# Patient Record
(demographics unavailable — no encounter records)

---

## 2025-06-09 NOTE — HISTORY OF PRESENT ILLNESS
[de-identified] : 6/9/25:  Visit was done with a  51 y/o F here for initial visit has history of multinodular thyroid for the past 3 years. Her last ultrasound was in 5/4/24 which revealed multinodular thyroid goiter, isthmus nodule increased in size, all other nodules stable, consider FNA for isthmus nodule recommended, and 6 month repeat US recommended. No family history of thyroid cancers, family history positive for other thyroid pathologies. Nonsmoker. Not currently taking thyroid medications. She complains of cough for the past 3 months.

## 2025-06-09 NOTE — PHYSICAL EXAM
[TextEntry] : PHYSICAL EXAM  General: The patient was alert and oriented and in no distress. Voice was clear.  Eyes: The patient was alert, oriented and in no distress. Voice was clear.  Eyes: Ocular motility normal. No proptosis. Conjunctiva normal. Sclera white. There was no significant nystagmus or disconjugate gaze noted.  Face: The patient had no facial asymmetry or mass. The skin was unremarkable.  Ears: External ears were normal without deformity. Ear canals were clear Tympanic membranes were intact and normal. No perforation or effusion  Nose: The external nose had no significant deformity. There was no facial tenderness. On anterior rhinoscopy, the nasal mucosa was healthy in appearance. The anterior septum was midline. There were no visualized polyps purulence or masses. See procedure note for endonasal exam.  Oral cavity: Oral mucosa- normal. Oral and base of tongue- clear and without mass. Gingival and buccal mucosa- moist and without lesions. Palate- the palate moved well. There was no cleft palate. There appeared to be good salivary flow. Oral cavity/oropharynx- no pus, erythema or mass.  Neck: The neck was symmetrical. The parotid and submandibular glands were normal without masses. The trachea was midline and there was no unusual crepitus. Thyroid was smooth and nontender, multinodular goiter Cervical adenopathy- none.

## 2025-06-09 NOTE — PROCEDURE
1 [de-identified] : PROCEDURE: Flexible laryngoscopy SURGEON: Dr. Haddad INDICATIONS: Patient unable to tolerate a mirror exam. Assess for laryngopharynx pharyngeal reflux. cough. head and neck mass.   Verbal Consent obtained.   ANESTHESIA: The patient was placed in a sitting position. Following application of the topical anesthetic and decongestant, exam was performed with a flexible scope. The scope was passed along the right nasal floor to the nasopharynx. It was then passed into the region of the middle meatus, middle turbinate, and sphenoethmoid region. An identical procedure was performed on the left side. The following findings were noted:   The nasal musoca was healthy appearing and the septum was roughly midline. The middle meatus and sphenoethmoid recesses were clear bilaterally.   Nasopharynx: no masses, choanae patent, no adenoid tissue Base of tongue/vallecula: no masses or asymmetry Pharyngeal walls: symmetrical. No masses. Pyriform sinuses: no lesions or pooling of secretions. Epiglottis: normal. No edema or lesions. Aryepiglottic folds: normal. No lesions. Vocal cords: clear and mobile. No lesions. Airway patent. Arytenoids: no edema or erythema. Interarytenoid area: no edema, erythema or lesion.   Patient tolerated the procedure well.  +Interarytenoid edema consistent with LPR 2 3

## 2025-06-09 NOTE — ASSESSMENT
[FreeTextEntry1] : - 6/9/25: 53 y/o F here for initial visit has history of multinodular thyroid for the past 3 years. Her last ultrasound was in 5/4/24 which revealed multinodular thyroid goiter, isthmus nodule increased in size, all other nodules stable, consider FNA for isthmus nodule recommended, and 6 month repeat US recommended. On physical exam she was found to have interarytenoid edema consistent with LPR and multinodular thyroid. We discussed treatment options for multiple thyroid nodules including observation versus total thyroidectomy. Did explain to her that the goiter will grow over time. Furthermore there is no medical treatment for this.  I once again emphasized that observation is an option.  She wishes to proceed with surgery. The risks and benefits were discussed including but not limited to transient or permanent vocal cord paralysis. I further explained will be rendered surgically hypothyroid and will require the need for lifelong thyroid replacement. Patient understands their options well and would like to proceed with surgical planning.  -Plan for total thyroidectomy